# Patient Record
Sex: FEMALE | Race: WHITE | NOT HISPANIC OR LATINO | Employment: FULL TIME | URBAN - METROPOLITAN AREA
[De-identification: names, ages, dates, MRNs, and addresses within clinical notes are randomized per-mention and may not be internally consistent; named-entity substitution may affect disease eponyms.]

---

## 2019-08-11 ENCOUNTER — HOSPITAL ENCOUNTER (EMERGENCY)
Facility: HOSPITAL | Age: 49
Discharge: HOME | End: 2019-08-11
Attending: EMERGENCY MEDICINE | Admitting: EMERGENCY MEDICINE

## 2019-08-11 VITALS
OXYGEN SATURATION: 100 % | BODY MASS INDEX: 21.26 KG/M2 | HEIGHT: 63 IN | RESPIRATION RATE: 16 BRPM | SYSTOLIC BLOOD PRESSURE: 119 MMHG | TEMPERATURE: 97.6 F | WEIGHT: 120 LBS | HEART RATE: 82 BPM | DIASTOLIC BLOOD PRESSURE: 78 MMHG

## 2019-08-11 DIAGNOSIS — R11.2 NON-INTRACTABLE VOMITING WITH NAUSEA, UNSPECIFIED VOMITING TYPE: Primary | ICD-10-CM

## 2019-08-11 LAB
ALBUMIN SERPL-MCNC: 3.8 G/DL (ref 3.5–5.2)
ALBUMIN/GLOB SERPL: 1.5 G/DL
ALP SERPL-CCNC: 56 U/L (ref 39–117)
ALT SERPL W P-5'-P-CCNC: 13 U/L (ref 1–33)
ANION GAP SERPL CALCULATED.3IONS-SCNC: 12 MMOL/L (ref 5–15)
AST SERPL-CCNC: 20 U/L (ref 1–32)
BASOPHILS # BLD AUTO: 0.06 10*3/MM3 (ref 0–0.2)
BASOPHILS NFR BLD AUTO: 1.1 % (ref 0–1.5)
BILIRUB SERPL-MCNC: 0.5 MG/DL (ref 0.2–1.2)
BILIRUB UR QL STRIP: NEGATIVE
BUN BLD-MCNC: 10 MG/DL (ref 6–20)
BUN/CREAT SERPL: 16.4 (ref 7–25)
CALCIUM SPEC-SCNC: 8.7 MG/DL (ref 8.6–10.5)
CHLORIDE SERPL-SCNC: 108 MMOL/L (ref 98–107)
CLARITY UR: CLEAR
CO2 SERPL-SCNC: 23 MMOL/L (ref 22–29)
COLOR UR: YELLOW
CREAT BLD-MCNC: 0.61 MG/DL (ref 0.57–1)
DEPRECATED RDW RBC AUTO: 41.2 FL (ref 37–54)
EOSINOPHIL # BLD AUTO: 0.16 10*3/MM3 (ref 0–0.4)
EOSINOPHIL NFR BLD AUTO: 3 % (ref 0.3–6.2)
ERYTHROCYTE [DISTWIDTH] IN BLOOD BY AUTOMATED COUNT: 12.4 % (ref 12.3–15.4)
GFR SERPL CREATININE-BSD FRML MDRD: 104 ML/MIN/1.73
GLOBULIN UR ELPH-MCNC: 2.5 GM/DL
GLUCOSE BLD-MCNC: 175 MG/DL (ref 65–99)
GLUCOSE UR STRIP-MCNC: NEGATIVE MG/DL
HCT VFR BLD AUTO: 39.5 % (ref 34–46.6)
HGB BLD-MCNC: 13 G/DL (ref 12–15.9)
HGB UR QL STRIP.AUTO: NEGATIVE
HOLD SPECIMEN: NORMAL
HOLD SPECIMEN: NORMAL
IMM GRANULOCYTES # BLD AUTO: 0.03 10*3/MM3 (ref 0–0.05)
IMM GRANULOCYTES NFR BLD AUTO: 0.6 % (ref 0–0.5)
KETONES UR QL STRIP: NEGATIVE
LEUKOCYTE ESTERASE UR QL STRIP.AUTO: NEGATIVE
LIPASE SERPL-CCNC: 23 U/L (ref 13–60)
LYMPHOCYTES # BLD AUTO: 1.1 10*3/MM3 (ref 0.7–3.1)
LYMPHOCYTES NFR BLD AUTO: 20.5 % (ref 19.6–45.3)
MCH RBC QN AUTO: 30 PG (ref 26.6–33)
MCHC RBC AUTO-ENTMCNC: 32.9 G/DL (ref 31.5–35.7)
MCV RBC AUTO: 91.2 FL (ref 79–97)
MONOCYTES # BLD AUTO: 0.52 10*3/MM3 (ref 0.1–0.9)
MONOCYTES NFR BLD AUTO: 9.7 % (ref 5–12)
NEUTROPHILS # BLD AUTO: 3.49 10*3/MM3 (ref 1.7–7)
NEUTROPHILS NFR BLD AUTO: 65.1 % (ref 42.7–76)
NITRITE UR QL STRIP: NEGATIVE
NRBC BLD AUTO-RTO: 0 /100 WBC (ref 0–0.2)
PH UR STRIP.AUTO: 7 [PH] (ref 5–8)
PLATELET # BLD AUTO: 269 10*3/MM3 (ref 140–450)
PMV BLD AUTO: 9.3 FL (ref 6–12)
POTASSIUM BLD-SCNC: 3.4 MMOL/L (ref 3.5–5.2)
PROT SERPL-MCNC: 6.3 G/DL (ref 6–8.5)
PROT UR QL STRIP: NEGATIVE
RBC # BLD AUTO: 4.33 10*6/MM3 (ref 3.77–5.28)
SODIUM BLD-SCNC: 143 MMOL/L (ref 136–145)
SP GR UR STRIP: 1.01 (ref 1–1.03)
UROBILINOGEN UR QL STRIP: NORMAL
WBC NRBC COR # BLD: 5.36 10*3/MM3 (ref 3.4–10.8)
WHOLE BLOOD HOLD SPECIMEN: NORMAL
WHOLE BLOOD HOLD SPECIMEN: NORMAL

## 2019-08-11 PROCEDURE — 83690 ASSAY OF LIPASE: CPT | Performed by: EMERGENCY MEDICINE

## 2019-08-11 PROCEDURE — 99284 EMERGENCY DEPT VISIT MOD MDM: CPT

## 2019-08-11 PROCEDURE — 85025 COMPLETE CBC W/AUTO DIFF WBC: CPT | Performed by: EMERGENCY MEDICINE

## 2019-08-11 PROCEDURE — 80053 COMPREHEN METABOLIC PANEL: CPT | Performed by: EMERGENCY MEDICINE

## 2019-08-11 PROCEDURE — 81003 URINALYSIS AUTO W/O SCOPE: CPT | Performed by: EMERGENCY MEDICINE

## 2019-08-11 PROCEDURE — 25010000002 ONDANSETRON PER 1 MG: Performed by: NURSE PRACTITIONER

## 2019-08-11 PROCEDURE — 96374 THER/PROPH/DIAG INJ IV PUSH: CPT

## 2019-08-11 RX ORDER — DICYCLOMINE HYDROCHLORIDE 10 MG/1
20 CAPSULE ORAL ONCE
Status: COMPLETED | OUTPATIENT
Start: 2019-08-11 | End: 2019-08-11

## 2019-08-11 RX ORDER — DICYCLOMINE HCL 20 MG
20 TABLET ORAL EVERY 8 HOURS PRN
Qty: 12 TABLET | Refills: 0 | Status: SHIPPED | OUTPATIENT
Start: 2019-08-11

## 2019-08-11 RX ORDER — SODIUM CHLORIDE 0.9 % (FLUSH) 0.9 %
10 SYRINGE (ML) INJECTION AS NEEDED
Status: DISCONTINUED | OUTPATIENT
Start: 2019-08-11 | End: 2019-08-11 | Stop reason: HOSPADM

## 2019-08-11 RX ORDER — ONDANSETRON 2 MG/ML
4 INJECTION INTRAMUSCULAR; INTRAVENOUS ONCE
Status: COMPLETED | OUTPATIENT
Start: 2019-08-11 | End: 2019-08-11

## 2019-08-11 RX ORDER — ONDANSETRON 4 MG/1
4 TABLET, FILM COATED ORAL EVERY 8 HOURS PRN
Qty: 12 TABLET | Refills: 0 | Status: SHIPPED | OUTPATIENT
Start: 2019-08-11

## 2019-08-11 RX ADMIN — ONDANSETRON 4 MG: 2 INJECTION INTRAMUSCULAR; INTRAVENOUS at 08:58

## 2019-08-11 RX ADMIN — SODIUM CHLORIDE 1000 ML: 9 INJECTION, SOLUTION INTRAVENOUS at 08:57

## 2019-08-11 RX ADMIN — DICYCLOMINE HYDROCHLORIDE 20 MG: 10 CAPSULE ORAL at 10:08

## 2019-08-11 NOTE — ED PROVIDER NOTES
Subjective   Patient is a 48-year-old female resident of [facility] who presents with nausea and vomiting and abdominal cramping that began approximately 1 hour prior to arrival.  Patient is in town for a music festival.  Patient and boyfriend report that they both ate the same food at the music festival and he is not experiencing any symptoms.  Patient denies any sick contacts.  There is been no diarrhea        History provided by:  Patient and significant other  Vomiting   The primary symptoms include abdominal pain (Mild generalized abdominal cramping), nausea and vomiting. Primary symptoms do not include fever, diarrhea or dysuria. The illness began today. The onset was sudden. The problem has not changed since onset.  The abdominal pain began today. The abdominal pain has been unchanged since its onset. The abdominal pain is generalized. The abdominal pain does not radiate. The severity of the abdominal pain is 3/10. The abdominal pain is relieved by vomiting.   Nausea began today.   The illness does not include chills.       Review of Systems   Constitutional: Negative for chills and fever.   Gastrointestinal: Positive for abdominal pain (Mild generalized abdominal cramping), nausea and vomiting. Negative for diarrhea.   Genitourinary: Negative for dysuria, flank pain and frequency.   Neurological: Negative for dizziness and light-headedness.   All other systems reviewed and are negative.      Past Medical History:   Diagnosis Date   • Anxiety        Allergies   Allergen Reactions   • Erythromycin GI Intolerance   • Phenergan [Promethazine Hcl] Hallucinations       Past Surgical History:   Procedure Laterality Date   • CHOLECYSTECTOMY     • HYSTERECTOMY         History reviewed. No pertinent family history.    Social History     Socioeconomic History   • Marital status: Single     Spouse name: Not on file   • Number of children: Not on file   • Years of education: Not on file   • Highest education level:  Not on file   Tobacco Use   • Smoking status: Never Smoker   Substance and Sexual Activity   • Alcohol use: Yes   • Drug use: No           Objective   Physical Exam   Constitutional: She is oriented to person, place, and time. She appears well-developed and well-nourished.   HENT:   Right Ear: External ear normal.   Left Ear: External ear normal.   Mouth/Throat: Oropharynx is clear and moist.   Cardiovascular: Normal rate, regular rhythm, normal heart sounds and intact distal pulses.   Pulmonary/Chest: Effort normal and breath sounds normal.   Abdominal: Soft. Bowel sounds are normal. She exhibits no distension. There is no tenderness. There is no rebound and no guarding.   Neurological: She is alert and oriented to person, place, and time.   Skin: Skin is warm and dry.   Psychiatric: She has a normal mood and affect. Her behavior is normal.   Vitals reviewed.      Procedures           ED Course      Reexamination: Patient sitting upright in bed conversing with significant other at bedside.  States that her symptoms have improved moderately.  Denies any vomiting since arrival to the department.  Was able to walk to and from the bathroom without assistance.     Discussed lab findings with patient and advised to follow-up with PCP in the next 2 to 3 days or return to the ER with worsening of symptoms or development of new symptoms.  Advised patient to take medication as prescribed and to follow a clear liquid diet for 24 hours until symptoms resolved then may advance as tolerated.  Patient significant other verbalized understanding of all discussed.          MDM      Final diagnoses:   Non-intractable vomiting with nausea, unspecified vomiting type            Tete Shook, YVETTE  08/11/19 1033

## 2020-07-27 ENCOUNTER — OFFICE VISIT (OUTPATIENT)
Dept: ORTHOPEDIC SURGERY | Age: 50
End: 2020-07-27
Payer: COMMERCIAL

## 2020-07-27 VITALS — WEIGHT: 130 LBS | BODY MASS INDEX: 23.04 KG/M2 | TEMPERATURE: 98 F | HEIGHT: 63 IN | RESPIRATION RATE: 12 BRPM

## 2020-07-27 PROCEDURE — 99203 OFFICE O/P NEW LOW 30 MIN: CPT | Performed by: PHYSICIAN ASSISTANT

## 2020-07-27 NOTE — PROGRESS NOTES
New Patient: SPINE    Referring Provider:  No ref. provider found    Chief Complaint   Patient presents with    Lower Back Pain     NP, LSP       HISTORY OF PRESENT ILLNESS:      · The patient is being sent at the request of No ref. provider found in consultation as a new spine patient for low back pain and right leg pain. The patient is a 47 y.o. female whom reports symptoms for 2 months. Symptoms progressed over the last  1 month. Patient reports there was not a significant event to cause the symptoms. Today discomfort is report at 8 out of 10, describing it as sharp, aching, throbbing, numbness, stabbing. Symptoms are aggravated by: movement, lying down, turning. Patient has undergone recent treatment including, oral medication, HEP over the past two months, swimming, . Patient denies previous lumbar spine surgery. · The patient presents today as a new patient with lower back and radiating right leg pain. The patient describes that her pain is persistent and constant on the right side of the lower back and has progressively worsened over the past 2 months. She describes that she used to be able to control the pain with Aleve and ibuprofen however over the last month her pain is worsened to the point that over-the-counter medication does not help. Over the past 8 weeks the patient has attempted a home exercise program but she feels as if this is actually making the pain worse. The patient describes that she tries to swim daily as she does have a pool and describes that this seems to help most of all. The patient has most recently been seen by her primary care physician on 7/22/2020 and she was provided with an oral steroid pack however this causes some side effects including chest pain. Physical therapy has been ordered as well as an MRI of the lumbar spine. This MRI is scheduled for this coming Wednesday, 7/29/2020.   The patient describes that her right leg pain will radiate down through the knee and occasionally into the foot where she will have an electrical shocking type feeling this does occur on both feet but the right leg pain is worse. · The patient also describes today that she is having neck and left shoulder pain. She describes that last week she had a moment where she was driving and she could not swallow or talk due to left-sided neck and throat pain. This lasted approximately 10 minutes and the patient was fearful that she was having some sort of vascular issue such as a stroke. This did only last those 10 minutes and she did not go to the emergency room and she did not discuss this with her primary care physician. She will have occasional pain into the left shoulder but it does not radiate down the arm. Her lower back and right leg pain is more severe at this time.     Pain Assessment  Location of Pain: Back(LSP)  Location Modifiers: Left, Right, Posterior  Severity of Pain: 8  Quality of Pain: Sharp, Aching, Throbbing, Other (Comment)(STABBING, NUMBNESS)  Duration of Pain: Persistent  Frequency of Pain: Constant  Aggravating Factors: Bending, Kneeling, Squatting, Stairs, Other (Comment)(LAYING DOWN)  Limiting Behavior: Yes  Relieving Factors: Ice, Heat, Nsaids  Result of Injury: No  Work-Related Injury: No  Are there other pain locations you wish to document?: No      Associated signs and symptoms:   Neurogenic bowel or bladder symptoms:  no   Perceived weakness:  yes   Difficulty walking:  yes    Recent Imaging (within past one year)   Xrays: yes   MRI or CT of spine: no    Current/Past Treatment:   · Physical Therapy:  yes , HEP   · Chiropractic:  none  · Injection:  none  · Medications:   NSAIDS:  yes , Aleve and Ibuprofen    Muscle relaxer:  none   Steriods:  yes   Neuropathic medications:  none   Opioids:  none  · Previous surgery:  no  · Previous surgical consult:  no  · Other:  · Infection control  · Tested positive for MRSA in past 12 months:  no  · Tested positive for MSSA \"staph infection\" in past 12 months: no  · Tested positive for VRE (Vancomycin Resistant Enterococci) in past 12 months:   no  · Currently on any antibiotics for an infection: no  · Anticoagulants:  · On a blood thinner:  no   · Any history of bleeding disorder: no   · MRI Contraindication: no   · Previous Pain Management: no       Past medical, surgical, social and family history reviewed with the patient. Past Medical History:   Past Medical History:   Diagnosis Date    Anxiety     GERD (gastroesophageal reflux disease)     Peptic ulcer due to Helicobacter pylori 9/37/2249    Pneumonia       Past Surgical History:     Past Surgical History:   Procedure Laterality Date    CHOLECYSTECTOMY      HYSTERECTOMY      TUMOR EXCISION       Current Medications:     Current Outpatient Medications:     albuterol sulfate (PROAIR RESPICLICK) 031 (90 Base) MCG/ACT aerosol powder inhalation, 2 puffs every 6 hours prn, Disp: , Rfl:     famotidine (PEPCID) 20 MG tablet, Take 1 tablet by mouth 2 times daily for 30 days. , Disp: 60 tablet, Rfl: 0    escitalopram (LEXAPRO) 20 MG tablet, Take 20 mg by mouth daily. , Disp: , Rfl:     amphetamine-dextroamphetamine (ADDERALL) 20 MG tablet, Take 20 mg by mouth daily. , Disp: , Rfl:   Allergies:  Phenergan [promethazine hcl] and Erythromycin  Social History:    reports that she has never smoked. She has never used smokeless tobacco. She reports that she does not drink alcohol or use drugs.   Family History:   Family History   Problem Relation Age of Onset    Arthritis Mother     High Blood Pressure Mother     Other Mother         Parkinsons    Diabetes Father     High Cholesterol Father     Other Father         dementia    Cancer Maternal Grandmother     Cancer Maternal Grandfather     Cancer Sister         Melanoma         REVIEW OF SYSTEMS: ROS - 14 point    Constitutional: No fevers, chills, night sweats, unexplained weight loss  Eye: No vision changes or diplopia  ENT: No nasal congestion, postnasal drip, + sore throat. No tinnitus  Respiratory: + cough/ SOB (asthma)  CV: No chest pain or palpitations  GI: No nausea, abdominal pain, stool changes  : No dysuria or hematuria  Skin: No new or changing skin lesions, no rashes  MSK: No joint swelling, + morning stiffness, unusual joint pain, + low back pain, right leg pain, neck pain  Neurological: + headache, no confusion, no syncope  Psychiatric: No excessive anxiety or depression  Endocrine: No polyuria or polydipsia  Hematologic: No lymph node enlargement or excessive bleeding  Immunologic:No history of immune deficiency or immunomodulating drugs           PHYSICAL EXAM:    Vitals: Temperature 98 °F (36.7 °C), resp. rate 12, height 5' 3\" (1.6 m), weight 130 lb (59 kg). GENERAL EXAM:  · General Apparence: Patient is adequately groomed with no evidence of malnutrition. · Psychiatric: Orientation: The patient is oriented to time, place and person. The patient's mood and affect are appropriate   · Vascular: Examination reveals no swelling and palpation reveals no tenderness in upper or lower extremities. Good capillary refill. · The lymphatic examination of the neck, axillae and groin reveals all areas to be without enlargement or induration   Sensation is intact without deficit in the upper and lower extremities to light touch and pinprick  · Coordination of the upper and lower extremities are normal.    CERVICAL EXAMINATION:  · Inspection: Local inspection shows no step-off or bruising. Cervical alignment is normal. No instability is noted. · Palpation and Percussion: No evidence of tenderness at the midline. Paraspinal tenderness is not present. There is no paraspinal spasm. · Range of Motion:  pain-free ROM   · Strength: 5/5 bilateral upper extremities  · Special Tests:   Spurling's and Hatch's are negative bilaterally. Greene and Impingement tests are negative bilaterally.   · Skin:There are no rashes, ulcerations or lesions. · Reflexes: Bilaterally triceps, biceps and brachioradialis are 2+. Clonus absent bilaterally at the feet. No pathological reflexes are noted. · Gait & station:  antalgic on the right and no ataxia  · Additional Examinations:  · RIGHT UPPER EXTREMITY:  Inspection/examination of the right upper extremity does not show any tenderness, deformity or injury. Range of motion is normal and pain-free. There is no gross instability. There are no rashes, ulcerations or lesions. Strength and tone are normal. No atrophy or abnormal movements are noted. · LEFT UPPER EXTREMITY: Inspection/examination of the left upper extremity does not show any tenderness, deformity or injury. Range of motion is normal and pain-free. There is no gross instability. There are no rashes, ulcerations or lesions. Strength and tone are normal. No atrophy or abnormal movements are noted. LUMBAR/SACRAL EXAMINATION:  · Inspection: Local inspection shows no step-off or bruising. Lumbar alignment is normal. No instability is noted. · Palpation:   No evidence of tenderness at the midline. Lumbar paraspinal tenderness Moderate to severe right greater than left L4-5 and L5-S1 tenderness. Bursal tenderness No tenderness bilaterally  There is no paraspinal spasm. · Range of Motion: painful with facet loading with extension and rotation to the right and left with right sided pain in the facets. · Strength:   Strength testing is 5/5 in all muscle groups tested. · Special Tests:   Straight leg raise and crossed SLR negative. Trace's testing is negative bilaterally. FADIR's testing is negative bilaterally. Slump test negative. Bowstring test negative  · Skin: There are no rashes, ulcerations or lesions. · Reflexes: Reflexes are symmetrically 2+ at the patellar and ankle tendons. Clonus absent bilaterally at the feet.   · Gait & station: antalgic on the right and no ataxia  · Additional Examinations:  · RIGHT LOWER EXTREMITY: Inspection/examination of the right lower extremity does not show any tenderness, deformity or injury. Range of motion is unremarkable. There is no gross instability. There are no rashes, ulcerations or lesions. Strength and tone are normal. No atrophy or abnormal movements are noted. · LEFT LOWER EXTREMITY:  Inspection/examination of the left lower extremity does not show any tenderness, deformity or injury. Range of motion is unremarkable. There is no gross instability. There are no rashes, ulcerations or lesions. Strength and tone are normal. No atrophy or abnormal movements are noted. Diagnostic Testing:    Xrays:   X-ray of the lumbar spine with AP and lateral views from 7/22/20:    FINDINGS:  ALIGNMENT:  Unremarkable  BONES:  Unremarkable. No aggressive osseous lesion or fracture  POST-SURGICAL FINDINGS:  None  DISC LEVELS:  Mild multilevel spondylosis. FACET JOINTS:  Moderate to severe lower lumbar facet arthrosis. LUMBOSACRAL JUNCTION:  Unremarkable   SACRUM AND SI JOINTS:  Unremarkable  OTHER:  None    IMPRESSION      Moderate to severe lower lumbar facet arthrosis.     MRI or CT:  None  EMG:  None  Results for orders placed or performed in visit on 10/26/15   Lipid Panel   Result Value Ref Range    Cholesterol, Total 194 125 - 200 mg/dL   Lipid Panel   Result Value Ref Range    HDL 76 > OR = 46 mg/dL   Lipid Panel   Result Value Ref Range    Triglycerides 51 <150 mg/dL   Lipid Panel   Result Value Ref Range    LDL Calculated 108 <130 mg/dL (calc)   Lipid Panel   Result Value Ref Range    Chol/HDL Ratio 2.6 < OR = 5.0 (calc)   Lipid Panel   Result Value Ref Range    Cholesterol 118 mg/dL (calc)   Comprehensive Metabolic Panel   Result Value Ref Range    Glucose 103 (H) 65 - 99 mg/dL    BUN 9 7 - 25 mg/dL    CREATININE 0.73 0.50 - 1.10 mg/dL    Est, Glom Filt Rate 99 > OR = 60 mL/min/1.73m2    GFR African American 114 > OR = 60 mL/min/1.73m2    BUN/Creatinine Ratio NOT APPLICABLE 6 - 22 (calc) Sodium 139 135 - 146 mmol/L    Potassium 4.8 3.5 - 5.3 mmol/L    Chloride 104 98 - 110 mmol/L    CO2 28 19 - 30 mmol/L    Calcium 9.2 8.6 - 10.2 mg/dL    Total Protein 6.6 6.1 - 8.1 g/dL    Alb 4.2 3.6 - 5.1 g/dL    Globulin 2.4 1.9 - 3.7 g/dL (calc)    Albumin/Globulin Ratio 1.8 1.0 - 2.5 (calc)    Total Bilirubin 0.9 0.2 - 1.2 mg/dL    Alkaline Phosphatase 50 33 - 115 U/L    AST 17 10 - 35 U/L    ALT 11 6 - 29 U/L   CBC WITH DIFFERENTIAL/PLATELET   Result Value Ref Range    WBC 3.6 (L) 3.8 - 10.8 Thousand/uL    RBC 4.48 3.80 - 5.10 Million/uL    Hemoglobin 13.4 11.7 - 15.5 g/dL    Hematocrit 40.7 35.0 - 45.0 %    MCV 91.0 80.0 - 100.0 fL    MCH 29.9 27.0 - 33.0 pg    MCHC 32.9 32.0 - 36.0 g/dL    RDW 13.5 11.0 - 15.0 %    Platelets 627 387 - 050 Thousand/uL    MPV 7.6 7.5 - 11.5 fL    Neutrophils Absolute 1,883 1,500 - 7,800 cells/uL    Bands Absolute CANCELED 0 - 750 cells/uL    Metamyelocytes Absolute CANCELED 0 cells/uL    Myelocytes Absolute CANCELED 0 cells/uL    Promyelocytes Absolute CANCELED 0 cells/uL    Lymphocytes Absolute 1,166 850 - 3,900 cells/uL    Monocytes Absolute 360 200 - 950 cells/uL    Eosinophils Absolute 166 15 - 500 cells/uL    Basophils Absolute 25 0 - 200 cells/uL    Blasts Absolute CANCELED 0 cells/uL    nRBC CANCELED 0 cells/uL    Segs Relative 52.3 %    Band Neutrophils CANCELED %    Metamyelocytes Relative CANCELED %    Myelocyte Percent CANCELED %    Promyelocytes Percent CANCELED %    Lymphocytes % 32.4 %    Atypical Lymphocytes Relative CANCELED 0 - 10 %    Monocytes % 10.0 %    Eosinophils % 4.6 %    Basophils % 0.7 %    Blasts Relative CANCELED %    nRBC CANCELED 0 /100 WBC    Comment CANCELED    TSH, HIGH SENSITIVE   Result Value Ref Range    TSH 2.38 mIU/L       Impression (Medical Decision Making):       1. Lumbar radiculopathy    2. Spondylosis without myelopathy or radiculopathy, lumbar region    3.  Facet hypertrophy of lumbar region        Plan (Medical Decision Making): I discussed the diagnosis and the treatment options with Gisel Brandt today. In Summary:  The various treatment options were outlined and discussed with Gisel Brandt including:  Conservative care options: physical therapy, ice, medications, bracing, and activity modification. The indications for therapeutic injections. The indications for additional imaging/laboratory studies. The indications for (possible future) interventions. After considering the various options discussed, Gisel Brandt elected to pursue a course of treatment that includes the followin. Medications: Continue anti-inflammatories with appropriate GI Precautions including to stop if develop dark tarry stools or GI upset and to take with food. 2. PT:  Encouraged to continue with Home exercise program.    3. Further studies: Setup Lumbar MR without contrast to evaluate for soft tissue pathology or stenosis contributing to the back pain and paresthesia. The patient has failed a six week trial of a HEP program within the last 3 months. This will be completed on 2020.    4. Interventional:  After further imaging is obtained, interventional options will be reviewed and recommended. 5. Healthy Lifestyle Measures:  Patient education material reviewing the following was distributed to Gisel Brandt  Anatomic drawings  Healthy lifestyle education  Osteoporosis prevention,   Back and neck pain educational information   Advanced imaging preparedness    Posture education   Proper lifting and carrying techniques,   Weight management  Quitting smoking and   Minor ways to treat back pain  For further information regarding the spine conditions and to review interventional treatments the patient was directed to [employer].    6.  Follow up:  1-2 weeks    Gisel Brandt was instructed to call the office if her symptoms worsen or if new symptoms appear prior to the next scheduled visit.  She is specifically instructed to contact the office between now & her scheduled appointment if she has concerns related to her condition or if she needs assistance in scheduling the above tests. She is welcome to call for an appointment sooner if she has any additional concerns or questions. BERNARDO Ramsey, KASHMIR  Board Certified by the [employer] on Certification of Physician Assistants  Mitzi Negro 57  Partner of [facility] ([facility])       This dictation was performed with a verbal recognition program [facility]) and it was checked for errors. It is possible that there are still dictated errors within this office note. If so, please bring any errors to my attention for an addendum. All efforts were made to ensure that this office note is accurate.

## 2020-07-31 ENCOUNTER — OFFICE VISIT (OUTPATIENT)
Dept: ORTHOPEDIC SURGERY | Age: 50
End: 2020-07-31
Payer: COMMERCIAL

## 2020-07-31 VITALS — RESPIRATION RATE: 12 BRPM | HEIGHT: 63 IN | WEIGHT: 130 LBS | TEMPERATURE: 97.4 F | BODY MASS INDEX: 23.04 KG/M2

## 2020-07-31 PROCEDURE — 99213 OFFICE O/P EST LOW 20 MIN: CPT | Performed by: PHYSICIAN ASSISTANT

## 2020-07-31 NOTE — PROGRESS NOTES
Follow up: Huong Vallejo  1970  [id number]         Chief Complaint   Patient presents with    Lower Back Pain     F/u LSP         HISTORY OF PRESENT ILLNESS:  Ms. Viviana Paige is a 47 y.o. female returns for a follow up visit for multiple medical problems. Her current presenting problems are   1. Lumbar radiculopathy    2. Spondylosis without myelopathy or radiculopathy, lumbar region    3. Facet hypertrophy of lumbar region    . As per information/history obtained from the PADT(patient assessment and documentation tool) - She complains of pain in the mid back and lower back with radiation to the hips Right, upper leg Right, lower leg Right and feet Right She rates the pain 8/10 and describes it as sharp, dull, aching, throbbing, numbness, stabbing. Pain is made worse by: movement, lying down. She denies side effects from the current pain regimen. Patient reports that since the last follow up visit the physical functioning is unchanged, family/social relationships are unchanged, mood is unchanged and sleep patterns are unchanged, and that the overall functioning is unchanged. Patient denies neurological bowel or bladder. The patient presents today in follow-up to review the MRI of the lumbar spine. The patient was first seen in the office on 7/27/2020 but she had not completed the MRI at that time. The patient describes that swimming, a home exercise program, and using ibuprofen does help with her pain. The patient describes however that her pain is an 8/10 in severity. She describes that moving and lying down increase her pain and taking medication such as ibuprofen helps with her pain. Associated signs and symptoms:   Neurogenic bowel or bladder symptoms:  no   Perceived weakness:  yes   Difficulty walking:  yes            Past medical, surgical, social and family history reviewed with the patient.      Past Medical History:   Past Medical History:   Diagnosis Date    Anxiety     GERD (gastroesophageal reflux disease)     Peptic ulcer due to Helicobacter pylori 1/75/3281    Pneumonia       Past Surgical History:     Past Surgical History:   Procedure Laterality Date    CHOLECYSTECTOMY      HYSTERECTOMY      TUMOR EXCISION       Current Medications:     Current Outpatient Medications:     albuterol sulfate (PROAIR RESPICLICK) 354 (90 Base) MCG/ACT aerosol powder inhalation, 2 puffs every 6 hours prn, Disp: , Rfl:   Allergies:  Phenergan [promethazine hcl] and Erythromycin  Social History:    reports that she has never smoked. She has never used smokeless tobacco. She reports that she does not drink alcohol or use drugs. Family History:   Family History   Problem Relation Age of Onset    Arthritis Mother     High Blood Pressure Mother     Other Mother         Parkinsons    Diabetes Father     High Cholesterol Father     Other Father         dementia    Cancer Maternal Grandmother     Cancer Maternal Grandfather     Cancer Sister         Melanoma       REVIEW OF SYSTEMS:   CONSTITUTIONAL: Denies unexplained weight loss, fevers, chills or fatigue  NEUROLOGICAL: Denies unsteady gait or progressive weakness  MUSCULOSKELETAL: Denies joint swelling or redness  GI: Denies nausea, vomiting, diarrhea   : Denies bowel or bladder issues       PHYSICAL EXAM:    Vitals: Temperature 97.4 °F (36.3 °C), resp. rate 12, height 5' 3\" (1.6 m), weight 130 lb (59 kg). GENERAL EXAM:  · General Apparence: Patient is adequately groomed with no evidence of malnutrition. · Psychiatric: Orientation: The patient is oriented to time, place and person. The patient's mood and affect are appropriate   · Vascular: Examination reveals no swelling and palpation reveals no tenderness in upper or lower extremities. Good capillary refill.    · The lymphatic examination of the neck, axillae and groin reveals all areas to be without enlargement or induration  · Sensation is intact without deficit in the upper and lower extremities to light touch and pinprick  · Coordination of the upper and lower extremities are normal.  · RIGHT UPPER EXTREMITY:  Inspection/examination of the right upper extremity does not show any tenderness, deformity or injury. Range of motion is unremarkable and pain-free. There is no gross instability. There are no rashes, ulcerations or lesions. Strength and tone are normal. No atrophy or abnormal movements are noted. · LEFT UPPER EXTREMITY: Inspection/examination of the left upper extremity does not show any tenderness, deformity or injury. Range of motion is unremarkable and pain-free. There is no gross instability. There are no rashes, ulcerations or lesions. Strength and tone are normal. No atrophy or abnormal movements are noted. LUMBAR/SACRAL EXAMINATION:  · Inspection: Local inspection shows no step-off or bruising. Lumbar alignment is normal. No instability is noted. · Palpation:   No evidence of tenderness at the midline. Lumbar paraspinal tenderness: Moderate to severe L4-5 and L5-S1 tenderness. Right greater than left. Bursal tenderness No tenderness bilaterally  There is no paraspinal spasm. · Range of Motion: limited by 50% in all planes due to pain specifically with facet joint pain with extension to the right. · Strength:   Strength testing is 5/5 in all muscle groups tested. · Special Tests:   Straight leg raise and crossed SLR negative. Trace's testing is negative bilaterally. FADIR's testing is negative bilaterally. Bowstring test negative. Slump test negative. · Skin: There are no rashes, ulcerations or lesions. · Reflexes: Reflexes are symmetrically 1+ at the patellar and ankle tendons. Clonus absent bilaterally at the feet. · Gait & station: normal, patient ambulates without assistance and no ataxia  · Additional Examinations:  · RIGHT LOWER EXTREMITY: Inspection/examination of the right lower extremity does not show any tenderness, deformity or injury.  Range of motion is normal and pain-free. There is no gross instability. There are no rashes, ulcerations or lesions. Strength and tone are normal. No atrophy or abnormal movements are noted. · LEFT LOWER EXTREMITY:  Inspection/examination of the left lower extremity does not show any tenderness, deformity or injury. Range of motion is normal and pain-free. There is no gross instability. There are no rashes, ulcerations or lesions. Strength and tone are normal. No atrophy or abnormal movements are noted. Diagnostic Testing:    MRI of Lumbar Spine from 7/29/20:   FINDINGS:  ALIGNMENT: No abnormal curvature  BONE MARROW: Unremarkable. No aggressive osseous lesion or fracture  CONUS:  Unremarkable    DISC LEVELS: There is desiccation within the L3-4 through L5-S1 discs. T12-L1:  Unremarkable     L1-2:      Unremarkable    L2-3:      Unremarkable      L3-4:      There is a minimal disc bulge. No focal disc protrusion, significant canal or foraminal stenosis. L4-5:      There is a minimal disc bulge. There are degenerative facet changes. No focal disc protrusion, significant canal or foraminal stenosis. L5-S1:    There is a minimal disc bulge. There mild degenerative facet changes. No focal disc protrusion, significant canal or foraminal stenosis. LUMBOSACRAL JUNCTION: Unremarkable   SACRUM AND SI JOINTS:  Unremarkable  OTHER:  None    IMPRESSION      Mild lower lumbar degenerative changes. No focal lumbar disc protrusion, significant canal or foraminal stenosis throughout the lumbar spine.     Results for orders placed or performed in visit on 10/26/15   Lipid Panel   Result Value Ref Range    Cholesterol, Total 194 125 - 200 mg/dL   Lipid Panel   Result Value Ref Range    HDL 76 > OR = 46 mg/dL   Lipid Panel   Result Value Ref Range    Triglycerides 51 <150 mg/dL   Lipid Panel   Result Value Ref Range    LDL Calculated 108 <130 mg/dL (calc)   Lipid Panel   Result Value Ref Range    Chol/HDL Ratio 2.6 < OR = 5.0 (calc)   Lipid Panel   Result Value Ref Range    Cholesterol 118 mg/dL (calc)   Comprehensive Metabolic Panel   Result Value Ref Range    Glucose 103 (H) 65 - 99 mg/dL    BUN 9 7 - 25 mg/dL    CREATININE 0.73 0.50 - 1.10 mg/dL    Est, Glom Filt Rate 99 > OR = 60 mL/min/1.73m2    GFR African American 114 > OR = 60 mL/min/1.73m2    BUN/Creatinine Ratio NOT APPLICABLE 6 - 22 (calc)    Sodium 139 135 - 146 mmol/L    Potassium 4.8 3.5 - 5.3 mmol/L    Chloride 104 98 - 110 mmol/L    CO2 28 19 - 30 mmol/L    Calcium 9.2 8.6 - 10.2 mg/dL    Total Protein 6.6 6.1 - 8.1 g/dL    Alb 4.2 3.6 - 5.1 g/dL    Globulin 2.4 1.9 - 3.7 g/dL (calc)    Albumin/Globulin Ratio 1.8 1.0 - 2.5 (calc)    Total Bilirubin 0.9 0.2 - 1.2 mg/dL    Alkaline Phosphatase 50 33 - 115 U/L    AST 17 10 - 35 U/L    ALT 11 6 - 29 U/L   CBC WITH DIFFERENTIAL/PLATELET   Result Value Ref Range    WBC 3.6 (L) 3.8 - 10.8 Thousand/uL    RBC 4.48 3.80 - 5.10 Million/uL    Hemoglobin 13.4 11.7 - 15.5 g/dL    Hematocrit 40.7 35.0 - 45.0 %    MCV 91.0 80.0 - 100.0 fL    MCH 29.9 27.0 - 33.0 pg    MCHC 32.9 32.0 - 36.0 g/dL    RDW 13.5 11.0 - 15.0 %    Platelets 859 921 - 845 Thousand/uL    MPV 7.6 7.5 - 11.5 fL    Neutrophils Absolute 1,883 1,500 - 7,800 cells/uL    Bands Absolute CANCELED 0 - 750 cells/uL    Metamyelocytes Absolute CANCELED 0 cells/uL    Myelocytes Absolute CANCELED 0 cells/uL    Promyelocytes Absolute CANCELED 0 cells/uL    Lymphocytes Absolute 1,166 850 - 3,900 cells/uL    Monocytes Absolute 360 200 - 950 cells/uL    Eosinophils Absolute 166 15 - 500 cells/uL    Basophils Absolute 25 0 - 200 cells/uL    Blasts Absolute CANCELED 0 cells/uL    nRBC CANCELED 0 cells/uL    Segs Relative 52.3 %    Band Neutrophils CANCELED %    Metamyelocytes Relative CANCELED %    Myelocyte Percent CANCELED %    Promyelocytes Percent CANCELED %    Lymphocytes % 32.4 %    Atypical Lymphocytes Relative CANCELED 0 - 10 %    Monocytes % 10.0 %    Eosinophils % 4.6 %    Basophils % 0.7 %    Blasts Relative CANCELED %    nRBC CANCELED 0 /100 WBC    Comment CANCELED    TSH, HIGH SENSITIVE   Result Value Ref Range    TSH 2.38 mIU/L     Impression:       1. Lumbar radiculopathy    2. Spondylosis without myelopathy or radiculopathy, lumbar region    3. Facet hypertrophy of lumbar region        Plan:  Clinical Course: Above diagnoses are worsening    I discussed the diagnosis and the treatment options with Yung Valladares today. In Summary:  The various treatment options were outlined and discussed with Yung Valladares including:  Conservative care options: physical therapy, ice, medications, bracing, and activity modification. The indications for therapeutic injections. The indications for additional imaging/laboratory studies. The indications for (possible future) interventions. After considering the various options discussed, Yung Valladares elected to pursue a course of treatment that includes the followin. Medications:  Continue anti-inflammatories with appropriate GI Precautions including to stop if develop dark tarry stools or GI upset and to take with food. 2. PT:  I will start the patient on a trial of PT to work on a lumbar stabilization program to focus on core strengthening, core stabilizing, lumbar stretches, hamstring flexibility, modalities as indicated for 6-8 visits over the next 4-6 weeks. 3. Further studies: No further studies. The MRI of the lumbar spine was reviewed today in the office with the patient. 4. Interventional: We could proceed with a right L4-5 and L5-S1 facet joint injection if the patient does not improve with physical therapy. 5. Follow up:  4-6 weeks      Yung Valladares was instructed to call the office if her symptoms worsen or if new symptoms appear prior to the next scheduled visit.  She is specifically instructed to contact the office between now & her scheduled appointment if she has concerns related to her condition or if she